# Patient Record
Sex: MALE | Race: OTHER | Employment: UNEMPLOYED | URBAN - METROPOLITAN AREA
[De-identification: names, ages, dates, MRNs, and addresses within clinical notes are randomized per-mention and may not be internally consistent; named-entity substitution may affect disease eponyms.]

---

## 2022-02-26 ENCOUNTER — HOSPITAL ENCOUNTER (INPATIENT)
Age: 47
LOS: 2 days | Discharge: HOME | DRG: 420 | End: 2022-02-28
Attending: INTERNAL MEDICINE | Admitting: INTERNAL MEDICINE
Payer: MEDICAID

## 2022-02-26 DIAGNOSIS — E11.9 NEW ONSET TYPE 2 DIABETES MELLITUS (HCC): Primary | ICD-10-CM

## 2022-02-26 DIAGNOSIS — R73.9 HYPERGLYCEMIA: ICD-10-CM

## 2022-02-26 PROBLEM — E13.10 SECONDARY DM WITH DKA (HCC): Status: ACTIVE | Noted: 2022-02-26

## 2022-02-26 LAB
A/G RATIO: 1.3 (ref 1.1–2.2)
ALBUMIN SERPL-MCNC: 4 G/DL (ref 3.4–5)
ALP BLD-CCNC: 152 U/L (ref 40–129)
ALT SERPL-CCNC: 41 U/L (ref 10–40)
ANION GAP SERPL CALCULATED.3IONS-SCNC: 13 MMOL/L (ref 3–16)
ANION GAP SERPL CALCULATED.3IONS-SCNC: 18 MMOL/L (ref 3–16)
AST SERPL-CCNC: 30 U/L (ref 15–37)
BASE EXCESS VENOUS: 3.1 MMOL/L
BASOPHILS ABSOLUTE: 0 K/UL (ref 0–0.2)
BASOPHILS RELATIVE PERCENT: 0.4 %
BETA-HYDROXYBUTYRATE: 2.48 MMOL/L (ref 0–0.27)
BILIRUB SERPL-MCNC: 0.7 MG/DL (ref 0–1)
BILIRUBIN URINE: NEGATIVE
BLOOD, URINE: NEGATIVE
BUN BLDV-MCNC: 10 MG/DL (ref 7–20)
BUN BLDV-MCNC: 14 MG/DL (ref 7–20)
CALCIUM SERPL-MCNC: 8.7 MG/DL (ref 8.3–10.6)
CALCIUM SERPL-MCNC: 8.8 MG/DL (ref 8.3–10.6)
CARBOXYHEMOGLOBIN: 2.4 %
CHLORIDE BLD-SCNC: 111 MMOL/L (ref 99–110)
CHLORIDE BLD-SCNC: 88 MMOL/L (ref 99–110)
CLARITY: CLEAR
CO2: 24 MMOL/L (ref 21–32)
CO2: 24 MMOL/L (ref 21–32)
COLOR: YELLOW
CREAT SERPL-MCNC: 0.7 MG/DL (ref 0.9–1.3)
CREAT SERPL-MCNC: 1 MG/DL (ref 0.9–1.3)
EOSINOPHILS ABSOLUTE: 0 K/UL (ref 0–0.6)
EOSINOPHILS RELATIVE PERCENT: 0.3 %
GFR AFRICAN AMERICAN: >60
GFR AFRICAN AMERICAN: >60
GFR NON-AFRICAN AMERICAN: >60
GFR NON-AFRICAN AMERICAN: >60
GLUCOSE BLD-MCNC: 141 MG/DL (ref 70–99)
GLUCOSE BLD-MCNC: 142 MG/DL (ref 70–99)
GLUCOSE BLD-MCNC: 201 MG/DL (ref 70–99)
GLUCOSE BLD-MCNC: 208 MG/DL (ref 70–99)
GLUCOSE BLD-MCNC: 398 MG/DL (ref 70–99)
GLUCOSE BLD-MCNC: 411 MG/DL (ref 70–99)
GLUCOSE BLD-MCNC: 512 MG/DL (ref 70–99)
GLUCOSE BLD-MCNC: 880 MG/DL (ref 70–99)
GLUCOSE BLD-MCNC: >600 MG/DL (ref 70–99)
GLUCOSE URINE: >=1000 MG/DL
HCO3 VENOUS: 29 MMOL/L (ref 23–29)
HCT VFR BLD CALC: 48.3 % (ref 40.5–52.5)
HEMOGLOBIN: 16.5 G/DL (ref 13.5–17.5)
KETONES, URINE: 15 MG/DL
LEUKOCYTE ESTERASE, URINE: NEGATIVE
LIPASE: 64 U/L (ref 13–60)
LYMPHOCYTES ABSOLUTE: 1.5 K/UL (ref 1–5.1)
LYMPHOCYTES RELATIVE PERCENT: 22.4 %
MAGNESIUM: 2.2 MG/DL (ref 1.8–2.4)
MCH RBC QN AUTO: 31.6 PG (ref 26–34)
MCHC RBC AUTO-ENTMCNC: 34.3 G/DL (ref 31–36)
MCV RBC AUTO: 92.2 FL (ref 80–100)
METHEMOGLOBIN VENOUS: 0.6 %
MICROSCOPIC EXAMINATION: ABNORMAL
MONOCYTES ABSOLUTE: 0.6 K/UL (ref 0–1.3)
MONOCYTES RELATIVE PERCENT: 8.9 %
NEUTROPHILS ABSOLUTE: 4.4 K/UL (ref 1.7–7.7)
NEUTROPHILS RELATIVE PERCENT: 68 %
NITRITE, URINE: NEGATIVE
O2 SAT, VEN: 87 %
O2 THERAPY: NORMAL
PCO2, VEN: 48.6 MMHG (ref 40–50)
PDW BLD-RTO: 13.2 % (ref 12.4–15.4)
PERFORMED ON: ABNORMAL
PH UA: 6.5 (ref 5–8)
PH VENOUS: 7.39 (ref 7.35–7.45)
PHOSPHORUS: 2.1 MG/DL (ref 2.5–4.9)
PLATELET # BLD: 204 K/UL (ref 135–450)
PMV BLD AUTO: 11.1 FL (ref 5–10.5)
PO2, VEN: 54 MMHG
POTASSIUM SERPL-SCNC: 4 MMOL/L (ref 3.5–5.1)
POTASSIUM SERPL-SCNC: 4.2 MMOL/L (ref 3.5–5.1)
PROTEIN UA: NEGATIVE MG/DL
RBC # BLD: 5.24 M/UL (ref 4.2–5.9)
SODIUM BLD-SCNC: 130 MMOL/L (ref 136–145)
SODIUM BLD-SCNC: 148 MMOL/L (ref 136–145)
SPECIFIC GRAVITY UA: 1.03 (ref 1–1.03)
TCO2 CALC VENOUS: 31 MMOL/L
TOTAL PROTEIN: 7.2 G/DL (ref 6.4–8.2)
URINE REFLEX TO CULTURE: ABNORMAL
URINE TYPE: ABNORMAL
UROBILINOGEN, URINE: 0.2 E.U./DL
WBC # BLD: 6.5 K/UL (ref 4–11)

## 2022-02-26 PROCEDURE — 2580000003 HC RX 258: Performed by: PHYSICIAN ASSISTANT

## 2022-02-26 PROCEDURE — 85025 COMPLETE CBC W/AUTO DIFF WBC: CPT

## 2022-02-26 PROCEDURE — 36415 COLL VENOUS BLD VENIPUNCTURE: CPT

## 2022-02-26 PROCEDURE — 2580000003 HC RX 258: Performed by: INTERNAL MEDICINE

## 2022-02-26 PROCEDURE — 83690 ASSAY OF LIPASE: CPT

## 2022-02-26 PROCEDURE — 2500000003 HC RX 250 WO HCPCS: Performed by: INTERNAL MEDICINE

## 2022-02-26 PROCEDURE — 2580000003 HC RX 258: Performed by: STUDENT IN AN ORGANIZED HEALTH CARE EDUCATION/TRAINING PROGRAM

## 2022-02-26 PROCEDURE — 2000000000 HC ICU R&B

## 2022-02-26 PROCEDURE — 6360000002 HC RX W HCPCS: Performed by: INTERNAL MEDICINE

## 2022-02-26 PROCEDURE — 81003 URINALYSIS AUTO W/O SCOPE: CPT

## 2022-02-26 PROCEDURE — 96360 HYDRATION IV INFUSION INIT: CPT

## 2022-02-26 PROCEDURE — 84100 ASSAY OF PHOSPHORUS: CPT

## 2022-02-26 PROCEDURE — 6370000000 HC RX 637 (ALT 250 FOR IP): Performed by: INTERNAL MEDICINE

## 2022-02-26 PROCEDURE — 82803 BLOOD GASES ANY COMBINATION: CPT

## 2022-02-26 PROCEDURE — 82010 KETONE BODYS QUAN: CPT

## 2022-02-26 PROCEDURE — 99285 EMERGENCY DEPT VISIT HI MDM: CPT

## 2022-02-26 PROCEDURE — 80053 COMPREHEN METABOLIC PANEL: CPT

## 2022-02-26 PROCEDURE — 83735 ASSAY OF MAGNESIUM: CPT

## 2022-02-26 RX ORDER — POLYETHYLENE GLYCOL 3350 17 G/17G
17 POWDER, FOR SOLUTION ORAL DAILY PRN
Status: DISCONTINUED | OUTPATIENT
Start: 2022-02-26 | End: 2022-02-28 | Stop reason: HOSPADM

## 2022-02-26 RX ORDER — DEXTROSE MONOHYDRATE 25 G/50ML
12.5 INJECTION, SOLUTION INTRAVENOUS PRN
Status: DISCONTINUED | OUTPATIENT
Start: 2022-02-26 | End: 2022-02-28 | Stop reason: HOSPADM

## 2022-02-26 RX ORDER — 0.9 % SODIUM CHLORIDE 0.9 %
1000 INTRAVENOUS SOLUTION INTRAVENOUS ONCE
Status: COMPLETED | OUTPATIENT
Start: 2022-02-26 | End: 2022-02-26

## 2022-02-26 RX ORDER — POTASSIUM CHLORIDE 7.45 MG/ML
10 INJECTION INTRAVENOUS PRN
Status: DISCONTINUED | OUTPATIENT
Start: 2022-02-26 | End: 2022-02-27

## 2022-02-26 RX ORDER — SODIUM CHLORIDE 9 MG/ML
INJECTION, SOLUTION INTRAVENOUS CONTINUOUS
Status: DISCONTINUED | OUTPATIENT
Start: 2022-02-27 | End: 2022-02-27

## 2022-02-26 RX ORDER — MAGNESIUM SULFATE 1 G/100ML
1000 INJECTION INTRAVENOUS PRN
Status: DISCONTINUED | OUTPATIENT
Start: 2022-02-26 | End: 2022-02-27

## 2022-02-26 RX ORDER — SODIUM CHLORIDE 450 MG/100ML
INJECTION, SOLUTION INTRAVENOUS CONTINUOUS
Status: DISCONTINUED | OUTPATIENT
Start: 2022-02-26 | End: 2022-02-27

## 2022-02-26 RX ORDER — DEXTROSE AND SODIUM CHLORIDE 5; .45 G/100ML; G/100ML
INJECTION, SOLUTION INTRAVENOUS CONTINUOUS PRN
Status: DISCONTINUED | OUTPATIENT
Start: 2022-02-26 | End: 2022-02-27

## 2022-02-26 RX ADMIN — SODIUM CHLORIDE: 9 INJECTION, SOLUTION INTRAVENOUS at 23:39

## 2022-02-26 RX ADMIN — SODIUM CHLORIDE 5.7 UNITS/HR: 9 INJECTION, SOLUTION INTRAVENOUS at 19:15

## 2022-02-26 RX ADMIN — SODIUM CHLORIDE: 9 INJECTION, SOLUTION INTRAVENOUS at 23:43

## 2022-02-26 RX ADMIN — SODIUM CHLORIDE: 4.5 INJECTION, SOLUTION INTRAVENOUS at 20:45

## 2022-02-26 RX ADMIN — SODIUM PHOSPHATE, MONOBASIC, MONOHYDRATE 15 MMOL: 276; 142 INJECTION, SOLUTION INTRAVENOUS at 23:57

## 2022-02-26 RX ADMIN — DEXTROSE AND SODIUM CHLORIDE: 5; 450 INJECTION, SOLUTION INTRAVENOUS at 21:23

## 2022-02-26 RX ADMIN — POTASSIUM CHLORIDE 10 MEQ: 7.46 INJECTION, SOLUTION INTRAVENOUS at 23:39

## 2022-02-26 RX ADMIN — SODIUM CHLORIDE 1000 ML: 9 INJECTION, SOLUTION INTRAVENOUS at 14:29

## 2022-02-26 RX ADMIN — SODIUM CHLORIDE 1000 ML: 9 INJECTION, SOLUTION INTRAVENOUS at 14:28

## 2022-02-26 ASSESSMENT — ENCOUNTER SYMPTOMS
NAUSEA: 1
VOMITING: 1
SHORTNESS OF BREATH: 0
ABDOMINAL PAIN: 1

## 2022-02-26 NOTE — ED PROVIDER NOTES
reviewed. No pertinent family history. No family status information on file. SOCIAL HISTORY      reports that he has been smoking cigarettes. He started smoking about 9 years ago. He has never used smokeless tobacco. He reports previous alcohol use. He reports previous drug use. PHYSICAL EXAM    (up to 7 for level 4, 8 or more for level 5)     ED Triage Vitals   BP Temp Temp src Pulse Resp SpO2 Height Weight   -- -- -- -- -- -- -- --       Physical Exam  Constitutional:       General: He is not in acute distress. Appearance: Normal appearance. He is well-developed. He is not ill-appearing, toxic-appearing or diaphoretic. HENT:      Head: Normocephalic and atraumatic. Cardiovascular:      Rate and Rhythm: Normal rate and regular rhythm. Heart sounds: Normal heart sounds. Pulmonary:      Effort: Pulmonary effort is normal. No respiratory distress. Breath sounds: Normal breath sounds. Abdominal:      General: There is no distension. Palpations: Abdomen is soft. There is no mass. Tenderness: There is no abdominal tenderness. There is no guarding or rebound. Hernia: No hernia is present. Musculoskeletal:         General: Normal range of motion. Cervical back: Normal range of motion and neck supple. Skin:     General: Skin is warm. Neurological:      Mental Status: He is alert. Psychiatric:         Mood and Affect: Mood normal.         Behavior: Behavior normal.         Thought Content:  Thought content normal.         Judgment: Judgment normal.         DIFFERENTIAL DIAGNOSIS   DKA, hyperglycemia, new onset diabetes, [place] normality, other    DIAGNOSTICRESULTS         RADIOLOGY:   Non-plain film images such as CT, Ultrasound and MRI are read by the radiologist. Plain radiographic images are visualized and preliminarily interpreted by KARLOS aSeed with the below findings:      Interpretation per the Radiologist below, if available at the time of this note:    No orders to display         LABS:  Labs Reviewed   BETA-HYDROXYBUTYRATE - Abnormal; Notable for the following components:       Result Value    Beta-Hydroxybutyrate 2.48 (*)     All other components within normal limits    Narrative:     Jewels Stein tel. [phone],  Chemistry results called to and read back by Hector Best RN, 02/26/2022  15:43, by Joie Muñoz  Performed at:  [facility]  [address] [facility] [employer] [address]   Phone [phone]   CBC WITH AUTO DIFFERENTIAL - Abnormal; Notable for the following components:    MPV 11.1 (*)     All other components within normal limits    Narrative:     Performed at:  [address] [employer] [address]   Phone [phone]   COMPREHENSIVE METABOLIC PANEL - Abnormal; Notable for the following components:    Sodium 130 (*)     Chloride 88 (*)     Anion Gap 18 (*)     Glucose 880 (*)     Alkaline Phosphatase 152 (*)     ALT 41 (*)     All other components within normal limits    Narrative:     Jewels Stein tel. [phone],  Chemistry results called to and read back by Hector Best RN, 02/26/2022  15:43, by Joie Muñoz  Performed at:  [address] [address]   Phone [phone]   LIPASE - Abnormal; Notable for the following components:    Lipase 64.0 (*)     All other components within normal limits    Narrative:     Jewels Stein tel. [phone],  Chemistry results called to and read back by Hector Best RN, 02/26/2022  15:43, by Joie Muñoz  Performed at:  [facility]  [address] [facility] [address]   Phone [phone]   URINALYSIS WITH REFLEX TO CULTURE - Abnormal; Notable for the following components:    Glucose, Ur >=1000 (*)     Ketones, Urine 15 (*)     All other components within normal limits    Narrative:     Performed at:  [facility]  [address] [facility] [place] [address]   Phone [phone]   POCT GLUCOSE - Abnormal; Notable for the following components:    POC Glucose >600 (*)     All other components within normal limits    Narrative:     Performed at:  [facility]  [address] [facility] [place] [address]   Phone [phone]   POCT GLUCOSE - Abnormal; Notable for the following components:    POC Glucose 512 (*)     All other components within normal limits    Narrative:     Performed at:  [facility]  [address] [facility] [place] [address]   Phone [phone]   POCT GLUCOSE - Abnormal; Notable for the following components:    POC Glucose 411 (*)     All other components within normal limits    Narrative:     Performed at:  [facility]  [address] [facility] [place] [address]   Phone [phone]   POCT GLUCOSE - Abnormal; Notable for the following components:    POC Glucose 398 (*)     All other components within normal limits    Narrative:     Performed at:  [facility]  [address] [facility] [place] [address]   Phone [phone]   POCT GLUCOSE - Abnormal; Notable for the following components:    POC Glucose 208 (*)     All other components within normal limits    Narrative:     Performed at:  [facility]  [address] [facility] [employer] [address]   Phone [phone]   BLOOD GAS, VENOUS    Narrative:     Performed at:  [facility]  [address] [facility] [place] [address]   Phone [phone]   BASIC METABOLIC PANEL   BASIC METABOLIC PANEL   BASIC METABOLIC PANEL   MAGNESIUM   MAGNESIUM   MAGNESIUM   PHOSPHORUS   PHOSPHORUS   PHOSPHORUS   POCT GLUCOSE   POCT GLUCOSE   POCT GLUCOSE   POCT GLUCOSE   POCT GLUCOSE   POCT GLUCOSE   POCT GLUCOSE   POCT GLUCOSE   POCT GLUCOSE   POCT GLUCOSE   POCT GLUCOSE   POCT GLUCOSE   POCT GLUCOSE   POCT GLUCOSE   POCT GLUCOSE       All other labs were within normal range or not returned as of this dictation. EMERGENCY DEPARTMENT COURSE and DIFFERENTIALDIAGNOSIS/MDM:   Vitals:    Vitals:    02/26/22 1800 02/26/22 1830 02/26/22 1900 02/26/22 2012   BP: 116/83 (!) 121/90 122/85 126/81   Pulse: 66 73 66 64   Resp: 13 11 15 10   Temp:    97.5 °F (36.4 °C)   TempSrc:    Axillary   SpO2: 100%  100% 100%   Weight:    122 lb 2.2 oz (55.4 kg)   Height:           Patient wasnontoxic, well appearing, afebrile with normal vital signs. He is saturating well on room air. He appears well. However our glucometer is reading high. With his symptoms, suspect new onset diabetes with concern for DKA. He was given 2 L normal saline. His glucose was 880 with gap of 18. Bicarb normal.  Venous pH normal.  Beta hydroxy 2.48. He is not in DKA. Repeat glucose after fluids is 512. Discussed treatment with insulin drip versus intermittent dosing. hospitalist prefers drip and placed orders for it. Upon reevaluation he appears well. He is lying in stretcher in no distress. He is in agreement with plan for admission. He is in stable condition. Results, treatment, plan discussed via video  #[id number]    CRITICAL CARE TIME   Total Critical Care time was 35 minutes, excluding separately reportable procedures. There was a high probability of clinically significant/life threatening deterioration in the patient's condition which required my urgent intervention. Time was spent managing patient's hyperglycemia and new onset diabetes with fluid resuscitation, lab work, reevaluation, consultation          PROCEDURES:  None    FINAL IMPRESSION      1. New onset type 2 diabetes mellitus ([facility] [address]    2. Hyperglycemia        DISPOSITION/PLAN   DISPOSITION Admitted 02/26/2022 06:11:01 PM      PATIENT REFERRED TO:  No follow-up provider specified.     DISCHARGE MEDICATIONS:  There are no discharge medications for this patient.       (Please note that portions ofthis note were completed with a voice recognition program.  Efforts were made to edit the dictations but occasionally words are mis-transcribed.)    Harjit Armas, [address] [place] [address]  02/26/22 6010

## 2022-02-27 LAB
ANION GAP SERPL CALCULATED.3IONS-SCNC: 12 MMOL/L (ref 3–16)
ANION GAP SERPL CALCULATED.3IONS-SCNC: 12 MMOL/L (ref 3–16)
BUN BLDV-MCNC: 7 MG/DL (ref 7–20)
BUN BLDV-MCNC: 8 MG/DL (ref 7–20)
CALCIUM SERPL-MCNC: 8.4 MG/DL (ref 8.3–10.6)
CALCIUM SERPL-MCNC: 8.4 MG/DL (ref 8.3–10.6)
CHLORIDE BLD-SCNC: 106 MMOL/L (ref 99–110)
CHLORIDE BLD-SCNC: 109 MMOL/L (ref 99–110)
CO2: 22 MMOL/L (ref 21–32)
CO2: 24 MMOL/L (ref 21–32)
CREAT SERPL-MCNC: 0.7 MG/DL (ref 0.9–1.3)
CREAT SERPL-MCNC: 0.7 MG/DL (ref 0.9–1.3)
GFR AFRICAN AMERICAN: >60
GFR AFRICAN AMERICAN: >60
GFR NON-AFRICAN AMERICAN: >60
GFR NON-AFRICAN AMERICAN: >60
GLUCOSE BLD-MCNC: 103 MG/DL (ref 70–99)
GLUCOSE BLD-MCNC: 109 MG/DL (ref 70–99)
GLUCOSE BLD-MCNC: 114 MG/DL (ref 70–99)
GLUCOSE BLD-MCNC: 122 MG/DL (ref 70–99)
GLUCOSE BLD-MCNC: 124 MG/DL (ref 70–99)
GLUCOSE BLD-MCNC: 131 MG/DL (ref 70–99)
GLUCOSE BLD-MCNC: 143 MG/DL (ref 70–99)
GLUCOSE BLD-MCNC: 151 MG/DL (ref 70–99)
GLUCOSE BLD-MCNC: 188 MG/DL (ref 70–99)
GLUCOSE BLD-MCNC: 203 MG/DL (ref 70–99)
GLUCOSE BLD-MCNC: 211 MG/DL (ref 70–99)
GLUCOSE BLD-MCNC: 232 MG/DL (ref 70–99)
GLUCOSE BLD-MCNC: 316 MG/DL (ref 70–99)
GLUCOSE BLD-MCNC: 383 MG/DL (ref 70–99)
MAGNESIUM: 2 MG/DL (ref 1.8–2.4)
MAGNESIUM: 2.1 MG/DL (ref 1.8–2.4)
PERFORMED ON: ABNORMAL
PHOSPHORUS: 3 MG/DL (ref 2.5–4.9)
PHOSPHORUS: 3.4 MG/DL (ref 2.5–4.9)
POTASSIUM SERPL-SCNC: 3.4 MMOL/L (ref 3.5–5.1)
POTASSIUM SERPL-SCNC: 3.8 MMOL/L (ref 3.5–5.1)
SODIUM BLD-SCNC: 142 MMOL/L (ref 136–145)
SODIUM BLD-SCNC: 143 MMOL/L (ref 136–145)

## 2022-02-27 PROCEDURE — 2580000003 HC RX 258: Performed by: INTERNAL MEDICINE

## 2022-02-27 PROCEDURE — 1200000000 HC SEMI PRIVATE

## 2022-02-27 PROCEDURE — 84100 ASSAY OF PHOSPHORUS: CPT

## 2022-02-27 PROCEDURE — 80048 BASIC METABOLIC PNL TOTAL CA: CPT

## 2022-02-27 PROCEDURE — 6360000002 HC RX W HCPCS: Performed by: INTERNAL MEDICINE

## 2022-02-27 PROCEDURE — 6370000000 HC RX 637 (ALT 250 FOR IP): Performed by: INTERNAL MEDICINE

## 2022-02-27 PROCEDURE — 36415 COLL VENOUS BLD VENIPUNCTURE: CPT

## 2022-02-27 PROCEDURE — 83735 ASSAY OF MAGNESIUM: CPT

## 2022-02-27 PROCEDURE — 83036 HEMOGLOBIN GLYCOSYLATED A1C: CPT

## 2022-02-27 RX ORDER — DEXTROSE MONOHYDRATE 25 G/50ML
12.5 INJECTION, SOLUTION INTRAVENOUS PRN
Status: DISCONTINUED | OUTPATIENT
Start: 2022-02-27 | End: 2022-02-28 | Stop reason: HOSPADM

## 2022-02-27 RX ORDER — MAGNESIUM SULFATE 1 G/100ML
1000 INJECTION INTRAVENOUS PRN
Status: DISCONTINUED | OUTPATIENT
Start: 2022-02-27 | End: 2022-02-28 | Stop reason: HOSPADM

## 2022-02-27 RX ORDER — NICOTINE POLACRILEX 4 MG
15 LOZENGE BUCCAL PRN
Status: DISCONTINUED | OUTPATIENT
Start: 2022-02-27 | End: 2022-02-28 | Stop reason: HOSPADM

## 2022-02-27 RX ORDER — INSULIN GLARGINE 100 [IU]/ML
5 INJECTION, SOLUTION SUBCUTANEOUS ONCE
Status: COMPLETED | OUTPATIENT
Start: 2022-02-27 | End: 2022-02-27

## 2022-02-27 RX ORDER — POTASSIUM CHLORIDE 7.45 MG/ML
10 INJECTION INTRAVENOUS PRN
Status: DISCONTINUED | OUTPATIENT
Start: 2022-02-27 | End: 2022-02-28 | Stop reason: HOSPADM

## 2022-02-27 RX ORDER — DEXTROSE MONOHYDRATE 50 MG/ML
100 INJECTION, SOLUTION INTRAVENOUS PRN
Status: DISCONTINUED | OUTPATIENT
Start: 2022-02-27 | End: 2022-02-28 | Stop reason: HOSPADM

## 2022-02-27 RX ADMIN — INSULIN LISPRO 4 UNITS: 100 INJECTION, SOLUTION INTRAVENOUS; SUBCUTANEOUS at 12:53

## 2022-02-27 RX ADMIN — INSULIN GLARGINE 5 UNITS: 100 INJECTION, SOLUTION SUBCUTANEOUS at 09:04

## 2022-02-27 RX ADMIN — ENOXAPARIN SODIUM 40 MG: 100 INJECTION SUBCUTANEOUS at 08:20

## 2022-02-27 RX ADMIN — POTASSIUM CHLORIDE 10 MEQ: 7.46 INJECTION, SOLUTION INTRAVENOUS at 06:48

## 2022-02-27 RX ADMIN — POTASSIUM CHLORIDE 10 MEQ: 7.46 INJECTION, SOLUTION INTRAVENOUS at 01:27

## 2022-02-27 RX ADMIN — DEXTROSE AND SODIUM CHLORIDE: 5; 450 INJECTION, SOLUTION INTRAVENOUS at 04:48

## 2022-02-27 RX ADMIN — INSULIN LISPRO 8 UNITS: 100 INJECTION, SOLUTION INTRAVENOUS; SUBCUTANEOUS at 17:15

## 2022-02-27 RX ADMIN — POTASSIUM CHLORIDE 10 MEQ: 7.46 INJECTION, SOLUTION INTRAVENOUS at 03:25

## 2022-02-27 RX ADMIN — INSULIN LISPRO 5 UNITS: 100 INJECTION, SOLUTION INTRAVENOUS; SUBCUTANEOUS at 22:34

## 2022-02-27 RX ADMIN — POTASSIUM CHLORIDE 10 MEQ: 7.46 INJECTION, SOLUTION INTRAVENOUS at 08:07

## 2022-02-27 RX ADMIN — POTASSIUM CHLORIDE 10 MEQ: 7.46 INJECTION, SOLUTION INTRAVENOUS at 05:10

## 2022-02-27 ASSESSMENT — PAIN SCALES - GENERAL
PAINLEVEL_OUTOF10: 0

## 2022-02-27 NOTE — PROGRESS NOTES
Pt arrived to room 4115. Pt A&Ox4, stedy with ambulation, denies any pain. Pt oriented to room. Pt denies current needs. Pt resting comfortably in bed, all safety measures in place. Will continue to montior.     Electronically signed by Emma Rhoades RN on 2022 at 1:06 PM

## 2022-02-27 NOTE — PLAN OF CARE
Problem: Discharge Planning:  Goal: Discharged to appropriate level of care  Description: Discharged to appropriate level of care  2/27/2022 1003 by Baljit Ziegler RN  Outcome: Ongoing     Problem: Fluid Volume - Imbalance:  Goal: Will remain free of signs and symptoms of dehydration  Description: Will remain free of signs and symptoms of dehydration  2/27/2022 1003 by Baljit Ziegler RN  Outcome: Ongoing     Problem: Fluid Volume - Imbalance:  Goal: Absence of imbalanced fluid volume signs and symptoms  Description: Absence of imbalanced fluid volume signs and symptoms  2/27/2022 1003 by Baljit Ziegler RN  Outcome: Ongoing     Problem: Serum Glucose Level - Abnormal:  Goal: Ability to maintain appropriate glucose levels will improve  Description: Ability to maintain appropriate glucose levels will improve  2/27/2022 1003 by Baljit Ziegler RN  Outcome: Ongoing     Problem: Injury - Acid Base Imbalance:  Goal: Acid-base balance  Description: Acid-base balance  2/27/2022 1003 by Baljit Ziegler RN  Outcome: Ongoing     Problem: Falls - Risk of:  Goal: Will remain free from falls  Description: Will remain free from falls  Outcome: Ongoing     Problem: Falls - Risk of:  Goal: Absence of physical injury  Description: Absence of physical injury  Outcome: Ongoing     Problem: Skin Integrity:  Goal: Will show no infection signs and symptoms  Description: Will show no infection signs and symptoms  Outcome: Ongoing     Problem: Skin Integrity:  Goal: Absence of new skin breakdown  Description: Absence of new skin breakdown  Outcome: Ongoing

## 2022-02-27 NOTE — DISCHARGE INSTR - DIET
Good nutrition is important when healing from an illness, injury, or surgery. Follow any nutrition recommendations given to you during your hospital stay. If you were given an oral nutrition supplement while in the hospital, continue to take this supplement at home. You can take it with meals, in-between meals, and/or before bedtime. These supplements can be purchased at most local grocery stores, pharmacies, and chain [employer]-stores. If you have any questions about your diet or nutrition, call the hospital and ask for the dietitian.     Diabetic diet

## 2022-02-27 NOTE — PROGRESS NOTES
Pt transported by transport with chart and all belongings including cell phone, cell phone , coat, shoes, and clothes.

## 2022-02-27 NOTE — PROGRESS NOTES
4 Eyes Skin Assessment     NAME:  Shaina Menchaca  YOB: 1975  MEDICAL RECORD NUMBER:  [id number]    The patient is being assess for  Shift Handoff    I agree that 2 RN's have performed a thorough Head to Toe Skin Assessment on the patient. ALL assessment sites listed below have been assessed. Areas assessed by both nurses:    Head, Face, Ears, Shoulders, Back, Chest, Arms, Elbows, Hands, Sacrum. Buttock, Coccyx, Ischium and Legs. Feet and Heels        Does the Patient have a Wound?  No noted wound(s)       Juan J Prevention initiated:  Yes   Wound Care Orders initiated:  NA    Pressure Injury (Stage 3,4, Unstageable, DTI, NWPT, and Complex wounds) if present place consult order under [de-identified] NA    New and Established Ostomies if present place consult order under : NA      Nurse 1 eSignature: Electronically signed by Chencho Sargent RN on 2/27/2022 at 7:05 AM      **SHARE this note so that the co-signing nurse is able to place an eSignature**    Nurse 2 eSignature: Electronically signed by Josie Gunter RN on 2/27/22 at 11:15 AM EST

## 2022-02-27 NOTE — PROGRESS NOTES
Patient admitted into room 2114. Patient stand pivot from stretcher to bed without assistance, gait steady. Insulin drip infusing at 5.7 units/hr through LFA IV- site dry and intact. Vital signs stable. Bedside report received from [facility].

## 2022-02-27 NOTE — PROGRESS NOTES
Patients blood sugar dropped from 398 to 208. Rena Salcido NP notified. IVF changed to D5 .45 NS at 150. Insulin drip titrated. No new orders at this time. Will continue to monitor.

## 2022-02-27 NOTE — CARE COORDINATION
INITIAL CASE MANAGEMENT ASSESSMENT    Reviewed chart, met with patient to assess possible discharge needs. Explained Case Management role/services. Living Situation: Patient is visiting his friend in [place]. He is from [facility]? ADLs: Independent     DME: None    PT/OT Recs: None     Active Services: None     Transportation: Active /Friend will transport     Medications: No Meds/No Insurance    PCP: No PCP      HD/PD: N/A    PLAN/COMMENTS: Patient is staying with his friend Inderjit Man. His friend will pick him up from the hospital. I have given Inderjit Man the telephone number for [facility] and I have sent a referral for assistance with a PCP and Medications. I have also given Inderjit Man the numbers for [facility] to get a PCP. SW/CM provided contact information for patient or family to call with any questions. SW/CM will follow and assist as needed.   Electronically signed by Nitesh Nesbitt RN on 2/27/2022 at 2:57 PM

## 2022-02-27 NOTE — PROGRESS NOTES
Report called to [facility] on 4W. All questions answered. Pt belongings gathered and transport arranged for pt.

## 2022-02-27 NOTE — PROGRESS NOTES
Hospitalist Progress Note      PCP: No primary care provider on file. Chief Complaint. Patient is a 65-year-old Bulgarian-speaking male without significant past medical history who presented to hospital for urinary frequency. According to the patient he has been having nausea vomiting and urinary frequency increased thirst and epigastric abdominal pain. He denies previous history of diabetes mellitus. On further evaluation in the emergency department patient's blood glucose was found to be 880. Denied fevers chills chest pain diarrhea constipation dysuria. Date of Admission: 2/26/2022    Subjective:   denies chest pain, nausea, vomiting, shortness of breath, fever or chills. mention feels overall better    Medications:  Reviewed    Infusion Medications    dextrose       Scheduled Medications    insulin lispro  0-12 Units SubCUTAneous TID WC    insulin lispro  0-6 Units SubCUTAneous Nightly    enoxaparin  40 mg SubCUTAneous Daily     PRN Meds: glucose, dextrose, glucagon (rDNA), dextrose, potassium chloride, magnesium sulfate, dextrose, sodium phosphate IVPB **OR** sodium phosphate IVPB **OR** sodium phosphate IVPB, polyethylene glycol      Intake/Output Summary (Last 24 hours) at 2/27/2022 1832  Last data filed at 2/27/2022 1052  Gross per 24 hour   Intake 3777.77 ml   Output 2350 ml   Net 1427.77 ml       Physical Exam Performed:    /67   Pulse 67   Temp 97.9 °F (36.6 °C) (Oral)   Resp 16   Ht 5' 6\" (1.676 m)   Wt 127 lb 3.3 oz (57.7 kg)   SpO2 99%   BMI 20.53 kg/m²     General appearance: No apparent distress,   HEENT:  Conjunctivae/corneas clear. Neck: Supple, with full range of motion. Respiratory:  Normal respiratory effort. Clear to auscultation, bilaterally without Rales/Wheezes/Rhonchi. Cardiovascular: Regular rate and rhythm with normal S1/S2 without murmurs or rubs  Abdomen: Soft, non-tender, non-distended, normal bowel sounds.   Musculoskeletal: No cyanosis or edema bilaterally  Neurologic:  without any focal sensory/motor deficits. grossly non-focal.  Psychiatric: Alert and oriented, Normal mood  Peripheral Pulses: +2 palpable, equal bilaterally       Labs:   Recent Labs     02/26/22  1432   WBC 6.5   HGB 16.5   HCT 48.3        Recent Labs     02/26/22  2242 02/27/22  0213 02/27/22  0427   * 143 142   K 4.0 3.8 3.4*   * 109 106   CO2 24 22 24   BUN 10 8 7   CREATININE 0.7* 0.7* 0.7*   CALCIUM 8.7 8.4 8.4   PHOS 2.1* 3.0 3.4     Recent Labs     02/26/22  1432   AST 30   ALT 41*   BILITOT 0.7   ALKPHOS 152*     No results for input(s): INR in the last 72 hours. No results for input(s): [place] Sam in the last 72 hours. Urinalysis:      Lab Results   Component Value Date    NITRU Negative 02/26/2022    BLOODU Negative 02/26/2022    SPECGRAV 1.026 02/26/2022    GLUCOSEU >=1000 02/26/2022       Radiology:  No orders to display         Assessment/Plan:    Active Hospital Problems    Diagnosis     [facility] DM with D[facility] ([facility] [E13.10]      Patient is a 69-year-old Albanian-speaking male without significant past medical history who presented to hospital for urinary frequency. According to the patient he has been having nausea vomiting and urinary frequency increased thirst and epigastric abdominal pain. He denies previous history of diabetes mellitus. On further evaluation in the emergency department patient's blood glucose was found to be 880. Denied fevers chills chest pain diarrhea constipation dysuria.     Assessment  DKA, new onset diabetes mellitus  Hyponatremia likely pseudohyponatremia  Ketonuria  Elevated lipase level     Plan  Started on metformin, sitaglipton at discharge, monitor and replace electrolytes  Check A1c  DVT prophylaxis-Lovenox  Patient has persistent abdominal pain and not able to tolerate diet, would consider ordering CT abdomen to confirm pancreatitis  Diet: ADULT DIET;  Regular; 5 carb choices (75 gm/meal)  Code Status: Full Code    PT/OT Eval Status: ordered    Dispo - discharge    Renetta Nevarez MD

## 2022-02-27 NOTE — H&P
Hospital Medicine History & Physical      PCP: No primary care provider on file. Date of Admission: 2/26/2022    Chief Complaint: Urinary frequency    History Of Present Illness:  Patient is a 65-year-old Occitan-speaking male without significant past medical history who presented to hospital for urinary frequency. According to the patient he has been having nausea vomiting and urinary frequency increased thirst and epigastric abdominal pain. He denies previous history of diabetes mellitus. On further evaluation in the emergency department patient's blood glucose was found to be 880. Denied fevers chills chest pain diarrhea constipation dysuria. Past Medical History:      History reviewed. No pertinent past medical history. Past Surgical History:      History reviewed. No pertinent surgical history. Medications Prior to Admission:      Prior to Admission medications    Not on File       Allergies:  Patient has no known allergies. Social History:      TOBACCO:   reports that he has never smoked. He has never used smokeless tobacco.  ETOH:   reports previous alcohol use. Family History:       Reviewed in detail and non contributory      History reviewed. No pertinent family history. REVIEW OF SYSTEMS:   Pertinent positives as noted in the HPI. All other systems reviewed and negative. PHYSICAL EXAM PERFORMED:    /81   Pulse 64   Temp 97.5 °F (36.4 °C) (Axillary)   Resp 10   Ht 5' 6\" (1.676 m)   Wt 122 lb 2.2 oz (55.4 kg)   SpO2 100%   BMI 19.71 kg/m²     General appearance:  No apparent distress, cooperative. HEENT:  Normal cephalic, atraumatic without obvious deformity. Conjunctivae/corneas clear. Neck: Supple, with full range of motion. No cervical lymphadenopathy  Respiratory:  Normal respiratory effort. Clear to auscultation, bilaterally without Rales/Wheezes/Rhonchi.   Cardiovascular:  Regular rate and rhythm with normal S1/S2 without murmurs, rubs or to confirm pancreatitis  Diet: Diet NPO  Code Status: Full Code    PT/OT Eval Status: ordered    Dispo - pending clinical improvement       Rosamaria Moseley MD    The note was completed using EMR and Dragon dictation system. Every effort was made to ensure accuracy; however, inadvertent computerized transcription errors may be present. Thank you No primary care provider on file. for the opportunity to be involved in this patient's care. If you have any questions or concerns please feel free to contact me at [phone].     Rosamaria Moseley MD

## 2022-02-27 NOTE — PROGRESS NOTES
Dr. Argenis Marquis notified that pt's modifier for the insulin drip should be zero based on last fingerstick. Pt received his lantus and has ordered food. Per Dr. Argenis Marquis ok to discontinue insulin drip now and discontinue all IV fluids.

## 2022-02-27 NOTE — PLAN OF CARE
Problem: Discharge Planning:  Goal: Discharged to appropriate level of care  Description: Discharged to appropriate level of care  Outcome: Ongoing     Problem: Fluid Volume - Imbalance:  Goal: Will remain free of signs and symptoms of dehydration  Description: Will remain free of signs and symptoms of dehydration  Outcome: Ongoing     Problem: Serum Glucose Level - Abnormal:  Goal: Ability to maintain appropriate glucose levels will improve  Description: Ability to maintain appropriate glucose levels will improve  Outcome: Ongoing     Problem: Injury - Acid Base Imbalance:  Goal: Acid-base balance  Description: Acid-base balance  Outcome: Ongoing

## 2022-02-27 NOTE — PROGRESS NOTES
Patient is Cameroonian speaking. Language line used, spoke with  Amanda Alexandra # [id number]. Patient alert and oriented x4. Oriented to room, new orders and plan of care. Discussed admission questions. All questions answered. Patient states understanding. Call light and urinal in reach. Will continue to monitor.

## 2022-02-28 VITALS
HEART RATE: 82 BPM | DIASTOLIC BLOOD PRESSURE: 72 MMHG | SYSTOLIC BLOOD PRESSURE: 108 MMHG | WEIGHT: 127.21 LBS | BODY MASS INDEX: 20.44 KG/M2 | HEIGHT: 66 IN | TEMPERATURE: 98 F | OXYGEN SATURATION: 99 % | RESPIRATION RATE: 18 BRPM

## 2022-02-28 LAB
ANION GAP SERPL CALCULATED.3IONS-SCNC: 11 MMOL/L (ref 3–16)
BUN BLDV-MCNC: 10 MG/DL (ref 7–20)
CALCIUM SERPL-MCNC: 8.3 MG/DL (ref 8.3–10.6)
CHLORIDE BLD-SCNC: 101 MMOL/L (ref 99–110)
CO2: 24 MMOL/L (ref 21–32)
CREAT SERPL-MCNC: 0.8 MG/DL (ref 0.9–1.3)
ESTIMATED AVERAGE GLUCOSE: 383.8 MG/DL
GFR AFRICAN AMERICAN: >60
GFR NON-AFRICAN AMERICAN: >60
GLUCOSE BLD-MCNC: 247 MG/DL (ref 70–99)
GLUCOSE BLD-MCNC: 278 MG/DL (ref 70–99)
GLUCOSE BLD-MCNC: 350 MG/DL (ref 70–99)
HBA1C MFR BLD: 15 %
MAGNESIUM: 1.9 MG/DL (ref 1.8–2.4)
PERFORMED ON: ABNORMAL
PERFORMED ON: ABNORMAL
PHOSPHORUS: 3 MG/DL (ref 2.5–4.9)
POTASSIUM SERPL-SCNC: 3.4 MMOL/L (ref 3.5–5.1)
SODIUM BLD-SCNC: 136 MMOL/L (ref 136–145)

## 2022-02-28 PROCEDURE — 84100 ASSAY OF PHOSPHORUS: CPT

## 2022-02-28 PROCEDURE — 36415 COLL VENOUS BLD VENIPUNCTURE: CPT

## 2022-02-28 PROCEDURE — 83735 ASSAY OF MAGNESIUM: CPT

## 2022-02-28 PROCEDURE — 6370000000 HC RX 637 (ALT 250 FOR IP): Performed by: INTERNAL MEDICINE

## 2022-02-28 PROCEDURE — 80048 BASIC METABOLIC PNL TOTAL CA: CPT

## 2022-02-28 PROCEDURE — 6360000002 HC RX W HCPCS: Performed by: INTERNAL MEDICINE

## 2022-02-28 RX ORDER — BLOOD-GLUCOSE METER
1 KIT MISCELLANEOUS DAILY
Qty: 1 KIT | Refills: 0 | Status: SHIPPED | OUTPATIENT
Start: 2022-02-28 | End: 2022-06-07

## 2022-02-28 RX ORDER — INSULIN GLARGINE 100 [IU]/ML
10 INJECTION, SOLUTION SUBCUTANEOUS NIGHTLY
Qty: 2 PEN | Refills: 1 | Status: SHIPPED | OUTPATIENT
Start: 2022-02-28 | End: 2022-03-28

## 2022-02-28 RX ADMIN — INSULIN LISPRO 6 UNITS: 100 INJECTION, SOLUTION INTRAVENOUS; SUBCUTANEOUS at 13:23

## 2022-02-28 RX ADMIN — ENOXAPARIN SODIUM 40 MG: 100 INJECTION SUBCUTANEOUS at 09:39

## 2022-02-28 RX ADMIN — INSULIN LISPRO 4 UNITS: 100 INJECTION, SOLUTION INTRAVENOUS; SUBCUTANEOUS at 13:24

## 2022-02-28 RX ADMIN — INSULIN LISPRO 10 UNITS: 100 INJECTION, SOLUTION INTRAVENOUS; SUBCUTANEOUS at 09:37

## 2022-02-28 NOTE — CONSULTS
The Diabetes Educator reached out to me. New referral to our outpatient center for diabetes medication management and further patient education. Patient is Monegasque speaking. She asked that I schedule his appointment prior to discharge with the ease of the . I went to his room and used  Roland Nino (#[id number]) to schedule an appointment for 3/8/22. I gave directions to our center. He expressed understanding. Hopes to be discharged today. Appointment will show on his AVS at discharge. I gave him our phone number as well.      Tevin Barrientos, PharmD  [address] [place] [address]  Diabetes Service  [phone]

## 2022-02-28 NOTE — PLAN OF CARE
Problem: Discharge Planning:  Goal: Discharged to appropriate level of care  Description: Discharged to appropriate level of care  2/27/2022 1431 by Seamus Charles RN  Outcome: Completed     Problem: Fluid Volume - Imbalance:  Goal: Will remain free of signs and symptoms of dehydration  Description: Will remain free of signs and symptoms of dehydration  2/27/2022 1431 by Seamus Charles RN  Outcome: Completed  Goal: Absence of imbalanced fluid volume signs and symptoms  Description: Absence of imbalanced fluid volume signs and symptoms  2/27/2022 1431 by Seamus Charles RN  Outcome: Completed     Problem: Serum Glucose Level - Abnormal:  Goal: Ability to maintain appropriate glucose levels will improve  Description: Ability to maintain appropriate glucose levels will improve  2/27/2022 1431 by Seamus Charles RN  Outcome: Completed     Problem: Injury - Acid Base Imbalance:  Goal: Acid-base balance  Description: Acid-base balance  2/27/2022 1431 by Seamus Charles RN  Outcome: Completed     Problem: Falls - Risk of:  Goal: Will remain free from falls  Description: Will remain free from falls  2/27/2022 1431 by Seamus Charles RN  Outcome: Completed  Goal: Absence of physical injury  Description: Absence of physical injury  2/27/2022 1431 by Seamus Charles RN  Outcome: Completed     Problem: Skin Integrity:  Goal: Will show no infection signs and symptoms  Description: Will show no infection signs and symptoms  2/27/2022 1431 by Seamus Charles RN  Outcome: Completed  Goal: Absence of new skin breakdown  Description: Absence of new skin breakdown  2/27/2022 1431 by Seamus Charles RN  Outcome: Completed     Problem: Skin Integrity:  Goal: Skin integrity will stabilize  Description: Skin integrity will stabilize  Outcome: Ongoing     Problem: Discharge Planning:  Goal: Patients continuum of care needs are met  Description: Patients continuum of care needs are met  Outcome: Ongoing

## 2022-02-28 NOTE — PROGRESS NOTES
Pt being discharged home. AVS reviewed with patient and . Setswana dc instructions provided. Diabetes education complete. Transportation to take patient via wheelchair. Pt denies any questions or concerns at this time. PIV removed.

## 2022-02-28 NOTE — CARE COORDINATION
[facility] ([place], [place])  Diabetes Education   Progress Note       NAME:  Adelfo Bertrand  MEDICAL RECORD NUMBER:  [id number]  AGE: 51 y.o. GENDER: male  : 1975  TODAY'S DATE:  2022    Subjective   Reason for Diabetic Education Evaluation and Assessment: new insulin     Education session with video  service. [employer]  #  [id number]    Update in discharge meds to include insulin. Visit Type: follow-up      Adelfo Bertrand is a 51 y.o. male referred by:     [x] Physician  [] Nursing  [] Chart Review   [] Other:     PAST MEDICAL HISTORY        Diagnosis Date    Diabetes mellitus ([facility] [address]        PAST SURGICAL HISTORY    Past Surgical History:   Procedure Laterality Date    KIDNEY STONE REMOVAL         FAMILY HISTORY    History reviewed. No pertinent family history.     SOCIAL HISTORY    Social History     Tobacco Use    Smoking status: Light Tobacco Smoker     Types: Cigarettes     Start date:     Smokeless tobacco: Never Used   Vaping Use    Vaping Use: Never used   Substance Use Topics    Alcohol use: Not Currently    Drug use: Not Currently       ALLERGIES    No Known Allergies    MEDICATIONS     insulin lispro  6 Units SubCUTAneous Once    insulin lispro  0-12 Units SubCUTAneous TID WC    insulin lispro  0-6 Units SubCUTAneous Nightly    enoxaparin  40 mg SubCUTAneous Daily       Objective        Patient Active Problem List   Diagnosis Code    Secondary DM with DKA ([facility] [address] E13.10        /68   Pulse 65   Temp 97.6 °F (36.4 °C) (Oral)   Resp 18   Ht 5' 6\" (1.676 m)   Wt 127 lb 3.3 oz (57.7 kg)   SpO2 99%   BMI 20.53 kg/m²     HgBA1c:    Lab Results   Component Value Date    LABA1C 15.0 2022       Recent Labs     22  1648 22  1956 22  0751 22  1125   POCGLU 316* 383* 350* 247*       BUN/Creatinine:    Lab Results   Component Value Date    BUN 10 2022    CREATININE 0.8 2022       Assessment        Diabetes Management and Education    Does the patient require new medication instruction? No  Introduced insulin pens. Reviewed Lantus 10 units at bedtime. Person responsible for administration of Insulin/Medication:        [x] Self     [] Caregiver       [] Spouse       [] Other Family Member   []  Other    Insulin Instruction:  insulin pen  Injection Site:   [x] location    [x] rotation     Level of patient/caregiver understanding able to:      [x] Verbalized Understanding   [x] Demonstrated Understanding       [x] Teach Back       [] Needs Reinforcement     []  Other:        Plan        Ongoing diabetes education and blood glucose monitoring. Recommend meds to beds.         Teaching Time Diabetes Education:  25 minutes     Electronically signed by Heaven Matias on 2/28/2022 at 12:43 PM

## 2022-02-28 NOTE — DISCHARGE INSTR - COC
Continuity of Care Form    Patient Name: Aniceto Medina   :  1975  MRN:  [id number]    Admit date:  2022  Discharge date:  ***    Code Status Order: Full Code   Advance Directives:      Admitting Physician:  Rajni Esqueda MD  PCP: No primary care provider on file. Discharging Nurse: [facility] Unit/Room#: [id number]/3818-68  Discharging Unit Phone Number: ***    Emergency Contact:   Extended Emergency Contact Information  Primary Emergency Contact: Srikanth Nguyen [facility] Phone: [phone]  Relation: Other  Preferred language: English   needed? No    Past Surgical History:  Past Surgical History:   Procedure Laterality Date    KIDNEY STONE REMOVAL         Immunization History: There is no immunization history on file for this patient. Active Problems:  Patient Active Problem List   Diagnosis Code    Secondary DM with DKA ([facility] [address] E13.10       Isolation/Infection:   Isolation            No Isolation          Patient Infection Status       None to display            Nurse Assessment:  Last Vital Signs: /68   Pulse 65   Temp 97.6 °F (36.4 °C) (Oral)   Resp 18   Ht 5' 6\" (1.676 m)   Wt 127 lb 3.3 oz (57.7 kg)   SpO2 99%   BMI 20.53 kg/m²     Last documented pain score (0-10 scale): Pain Level: 0  Last Weight:   Wt Readings from Last 1 Encounters:   22 127 lb 3.3 oz (57.7 kg)     Mental Status:  {IP PT MENTAL STATUS:61016}    IV Access:  { JENS IV ACCESS:171280748}    Nursing Mobility/ADLs:  Walking   {CHP DME COUR:785234684}  Transfer  {CHP DME ZOWP:932438907}  Bathing  {CHP DME YQXF:049381624}  Dressing  {CHP DME ZVHK:208613003}  Toileting  {CHP DME JIYR:355402106}  Feeding  {CHP DME BSAR:743207998}  Med Admin  {CHP DME BFNC:442485304}  Med Delivery   { JENS MED Delivery:554172817}    Wound Care Documentation and Therapy:        Elimination:  Continence:    Bowel: {YES / IV:92315}  Bladder: {YES / DI:63167}  Urinary Catheter: {Urinary Catheter:548256070} Colostomy/Ileostomy/Ileal Conduit: {YES / SZ:27776}       Date of Last BM: ***    Intake/Output Summary (Last 24 hours) at 2022 1143  Last data filed at 2022 0841  Gross per 24 hour   Intake 1080 ml   Output 900 ml   Net 180 ml     I/O last 3 completed shifts: In: 4257.8 [P.O.:1200;  I.V.:2103; IV Piggyback:954.8]  Out: 3250 [Urine:3250]    Safety Concerns:     { JENS Safety Concerns:686272878}    Impairments/Disabilities:      [facility] Impairments/Disabilities:342332836}    Nutrition Therapy:  Current Nutrition Therapy:   [facility] JENS Diet List:542131405}    Routes of Feeding: {[facility] DME Other Feedings:847078505}  Liquids: {Slp liquid thickness:84119}  Daily Fluid Restriction: {CHP DME Yes amt example:398907892}  Last Modified Barium Swallow with Video (Video Swallowing Test): {Done Not Done LVJA:845738519}    Treatments at the Time of Hospital Discharge:   Respiratory Treatments: ***  Oxygen Therapy:  {Therapy; copd oxygen:48433}  Ventilator:    {[facility] Vent XBMB:026713109}    Rehab Therapies: {THERAPEUTIC INTERVENTION:9892195436}  Weight Bearing Status/Restrictions: [facility] CC Weight Bearin}  Other Medical Equipment (for information only, NOT a DME order):  {EQUIPMENT:089058229}  Other Treatments: ***    Patient's personal belongings (please select all that are sent with patient):  {[facility] DME Belongings:179270602}    RN SIGNATURE:  {Esignature:202175785}    CASE MANAGEMENT/SOCIAL WORK SECTION    Inpatient Status Date: ***    Readmission Risk Assessment Score:  Readmission Risk              Risk of Unplanned Readmission:  8           Discharging to Facility/ Agency   Name:   Address:  Phone:  Fax:    Dialysis Facility (if applicable)   Name:  Address:  Dialysis Schedule:  Phone:  Fax:    / signature: {Esignature:072067440}    PHYSICIAN SECTION    Prognosis: {Prognosis:8454895554}    Condition at Discharge: [facility] Patient Condition:432637344}    Rehab Potential (if transferring to Rehab): {Prognosis:8532869534}    Recommended Labs or Other Treatments After Discharge: ***    Physician Certification: I certify the above information and transfer of Leslie Boast  is necessary for the continuing treatment of the diagnosis listed and that he requires {Admit to Appropriate Level of Care:77014} for {GREATER/LESS:519422748} 30 days.      Update Admission H&P: {CHP DME Changes in SVSSA:178693486}    PHYSICIAN SIGNATURE:  {Esignature:011699059}

## 2022-02-28 NOTE — CARE COORDINATION
[facility]  Diabetes Education   Progress Note       NAME:  Aniceto Medina  MEDICAL RECORD NUMBER:  [id number]  AGE: 51 y.o. GENDER: male  : 1975  TODAY'S DATE:  2022    Subjective   Reason for Diabetic Education Evaluation and Assessment: general diabetes support    Education session with video  service.  Arnulfo Gan #[id number]. Kirk Grimm describes being surprised by the diagnosis of diabetes. He reports excessive thirst prior to hospitalization. Visit Type: evaluation      Aniceto Medina is a 51 y.o. male referred by:     [x] Physician  [] Nursing  [] Chart Review   [] Other:     PAST MEDICAL HISTORY        Diagnosis Date    Diabetes mellitus ([address]        PAST SURGICAL HISTORY    Past Surgical History:   Procedure Laterality Date    KIDNEY STONE REMOVAL         FAMILY HISTORY    History reviewed. No pertinent family history.     SOCIAL HISTORY    Social History     Tobacco Use    Smoking status: Light Tobacco Smoker     Types: Cigarettes     Start date:     Smokeless tobacco: Never Used   Vaping Use    Vaping Use: Never used   Substance Use Topics    Alcohol use: Not Currently    Drug use: Not Currently       ALLERGIES    No Known Allergies    MEDICATIONS     insulin lispro  0-12 Units SubCUTAneous TID WC    insulin lispro  0-6 Units SubCUTAneous Nightly    enoxaparin  40 mg SubCUTAneous Daily       Objective        Patient Active Problem List   Diagnosis Code    Secondary DM with DKA ([facility] [address] E13.10        /68   Pulse 65   Temp 97.6 °F (36.4 °C) (Oral)   Resp 18   Ht 5' 6\" (1.676 m)   Wt 127 lb 3.3 oz (57.7 kg)   SpO2 99%   BMI 20.53 kg/m²     HgBA1c:  No results found for: LABA1C    Recent Labs     22  1144 22  1648 22  1956 22  0751   POCGLU 203* 316* 383* 350*       BUN/Creatinine:    Lab Results   Component Value Date    BUN 10 2022    CREATININE 0.8 2022       Assessment        Diabetes Management and Education    Does the patient have a Primary Care Physician? No     Does the patient require new medication instruction? Yes     Does the patient/caregiver monitor Blood Glucoses? No:   Reviewed glycemic control targets, testing frequency and when to call PCP. Level of patient/caregiver understanding able to:        [] Verbalized Understanding   [] Demonstrated Understanding       [] Teach Back       [x] Needs Reinforcement     []  Other:        Does the patient/caregiver follow a Meal Plan? No: Works as a chief. Drinks juice, regular soda and water. Recommend making water, unsweetened tea or coffee primary drinks. Reviewed importance of eating three meals per day and plate method for consistent carb intake. Level of patient/caregiver understanding able to:       [x] Verbalized Understanding   [] Demonstrated Understanding       [x] Teach Back       [] Needs Reinforcement     []  Other:      Does the patient/caregiver understand S/S of Hypoglycemia? Yes  Reviewed symptoms, prevention and treatment. Level of patient/caregiver understanding able to:       [x] Verbalized Understanding   [] Demonstrated Understanding       [] Teach Back       [] Needs Reinforcement     []  Other:                    Does the patient/caregiver understand S/S of Hyperglycemia? No:     Reviewed symptoms, prevention and treatment. Level of patient/caregiver understanding able to:        [x] Verbalized Understanding   [] Demonstrated Understanding       [] Teach Back       [] Needs Reinforcement     []  Other:           Plan        Ongoing diabetes education and blood glucose monitoring. The following educational and support materials were provided:  · My contact information  · ADA booklet - Where Do I Begin?    · My Healthy Plate   · Blood Glucose Log Book                                           Discharge Plan:  Discharge needs: glucometer/strips/lancets  Placement for patient upon discharge: independent living        Teaching Time Diabetes Education:  40 minutes     Electronically signed by Silvino Pandya on 2/28/2022 at 10:05 AM

## 2022-02-28 NOTE — PROGRESS NOTES
CLINICAL PHARMACY NOTE: MEDS TO BEDS    Total # of Prescriptions Filled: 6   The following medications were delivered to the patient:  Current Discharge Medication List      START taking these medications    Details   metFORMIN (GLUCOPHAGE) 500 MG tablet Take 1 tablet by mouth 2 times daily (with meals) for 28 days  Qty: 28 tablet, Refills: 1      SITagliptin (JANUVIA) 50 MG tablet Take 1 tablet by mouth daily for 28 days  Qty: 14 tablet, Refills: 1      insulin glargine (LANTUS SOLOSTAR) 100 UNIT/ML injection pen Inject 10 Units into the skin nightly for 28 days  Qty: 2 pen, Refills: 1      glucose monitoring (FREESTYLE FREEDOM) kit 1 kit by Does not apply route daily  Qty: 1 kit, Refills: 0         · Test Strips  · Lancets  ·   ·     Additional Documentation:

## 2022-02-28 NOTE — CARE COORDINATION
DISCHARGE SUMMARY     DATE OF DISCHARGE: 02/28/2022    DISCHARGE DESTINATION: Home with friend, Jonny Dominguez: Rach Frote to transport pt home      COMMENTS: Referral for [facility] has been initiated for assist with a PCP and medications. Pt has the numbers for [employer] as well. No further assistance needed at this time.    Ria Farmer [place]  [phone]  Electronically signed by Karley Peck on 2/28/2022 at 12:24 PM

## 2022-03-02 NOTE — PROGRESS NOTES
Physician Progress Note      Marie Knowles  [facility] #:                  [id number]  :                       1975  ADMIT DATE:       2022 1:14 PM  Barak Cabello DATE:        2022 4:58 PM  RESPONDING  PROVIDER #:        Ace Camara MD          QUERY TEXT:    Patient admitted with DKA and is noted to have elevated lipase. Please   document in progress notes and discharge summary further specificity regarding   the acuity and etiology of pancreatitis:      The medical record reflects the following:  Risk Factors: DKA N/V abdominal pain  Clinical Indicators: per H&P \"Patient has persistent abdominal pain and not   able to tolerate diet, would consider ordering CT abdomen to confirm   pancreatitis\"   Lipase 64  Treatment: In ED 2L NS Bolus  IV Insulin  Options provided:  -- After study pancreatitis ruled out  -- Acute pancreatitis due to, Please specify etiology. -- Other - I will add my own diagnosis  -- Disagree - Not applicable / Not valid  -- Disagree - Clinically unable to determine / Unknown  -- Refer to Clinical Documentation Reviewer    PROVIDER RESPONSE TEXT:    After study pancreatitis ruled out.     Query created by: Samantha Zimmerman on 3/2/2022 8:58 AM      Electronically signed by:  Ace Camara MD 3/2/2022 9:57 AM

## 2022-03-09 NOTE — DISCHARGE SUMMARY
Hospital Medicine Discharge Summary    Patient ID: Anjel Green      Patient's PCP: No primary care provider on file. Admit Date: 2/26/2022     Discharge Date: 2/28/2022    Admitting Physician: Iveth Brumfield MD     Discharge Physician: Iveth Brumfield MD     Discharge Diagnoses: Active Hospital Problems    Diagnosis Date Noted    Secondary DM with DKA ([facility] [address] [E13.10] 02/26/2022       The patient was seen and examined on day of discharge and this discharge summary is in conjunction with any daily progress note from day of discharge. Condition at discharge - stable    Hospital Course: patient seen and evaluated on the day of discharge. Patient informed about following up with appointments. Patient verbalized understanding for follow-up appointments. The patient and / or the family were informed of the results of tests, a time was given to answer questions, a plan was proposed and they agreed with plan. Medical reconciliation performed. Patient discharged stable condition.       Patient is a 58-year-old Cameroonian-speaking male without significant past medical history who presented to hospital for urinary frequency.  According to the patient he has been having nausea vomiting and urinary frequency increased thirst and epigastric abdominal pain.  He denies previous history of diabetes mellitus.  On further evaluation in the emergency department patient's blood glucose was found to be 880.  Denied fevers chills chest pain diarrhea constipation dysuria.     Assessment  DKA, new onset diabetes mellitus  Hyponatremia likely pseudohyponatremia  Ketonuria  Elevated lipase level     Plan  Started on metformin, sitaglipton at discharge, monitor and replace electrolytes  Check A1c  DVT prophylaxis-Lovenox  Patient has persistent abdominal pain and not able to tolerate diet, would consider ordering CT abdomen to confirm pancreatitis    Follow up with pcp           Exam:     /72   Pulse 82   Temp 98 °F (36.7 °C) (Oral)   Resp 18   Ht 5' 6\" (1.676 m)   Wt 127 lb 3.3 oz (57.7 kg)   SpO2 99%   BMI 20.53 kg/m²     General appearance: No apparent distress  HEENT:  Conjunctivae/corneas clear. Neck: Supple, No jugular venous distention. Respiratory:  Normal respiratory effort. Clear to auscultation, bilaterally without Rales/Wheezes/Rhonchi. Cardiovascular: Regular rate and rhythm with normal S1/S2 without murmurs, rubs or gallops. Abdomen: Soft, non-tender, non-distended, normal bowel sounds. Musculoskelatal: No clubbing, cyanosis or edema bilaterally. Skin: Skin color, texture, turgor normal.   Neurologic: no focal neurologic deficits. grossly non-focal.  Psychiatric: Alert and oriented, normal mood    Consults:     IP CONSULT TO DIABETES EDUCATOR      Code Status:  Prior    Activity: activity as tolerated    Labs:  For convenience and continuity at follow-up the following most recent labs are provided:      CBC:    Lab Results   Component Value Date    WBC 6.5 02/26/2022    HGB 16.5 02/26/2022    HCT 48.3 02/26/2022     02/26/2022       Renal:    Lab Results   Component Value Date     02/28/2022    K 3.4 02/28/2022     02/28/2022    CO2 24 02/28/2022    BUN 10 02/28/2022    CREATININE 0.8 02/28/2022    CALCIUM 8.3 02/28/2022    PHOS 3.0 02/28/2022       Discharge Medications:     Discharge Medication List as of 2/28/2022  2:18 PM           Details   insulin glargine (LANTUS SOLOSTAR) 100 UNIT/ML injection pen Inject 10 Units into the skin nightly for 28 days, Disp-2 pen, R-1Normal      glucose monitoring (FREESTYLE FREEDOM) kit DAILY Starting Mon 2/28/2022, Until Tue 6/7/2022, For 99 days, Disp-1 kit, R-0, Normal              Details   metFORMIN (GLUCOPHAGE) 500 MG tablet Take 1 tablet by mouth 2 times daily (with meals) for 28 days, Disp-28 tablet, R-1Normal      SITagliptin (JANUVIA) 50 MG tablet Take 1 tablet by mouth daily for 28 days, Disp-14 tablet, R-1Normal             Time Spent on discharge is more than 30 mints in the examination, evaluation, counseling and review of medications and discharge plan. Signed:    Juany Dunlap MD   3/9/2022      Thank you No primary care provider on file. for the opportunity to be involved in this patient's care. If you have any questions or concerns please feel free to contact me at [phone].